# Patient Record
Sex: FEMALE | Race: WHITE | URBAN - METROPOLITAN AREA
[De-identification: names, ages, dates, MRNs, and addresses within clinical notes are randomized per-mention and may not be internally consistent; named-entity substitution may affect disease eponyms.]

---

## 2017-11-17 ENCOUNTER — OUTPATIENT (OUTPATIENT)
Dept: OUTPATIENT SERVICES | Facility: HOSPITAL | Age: 47
LOS: 1 days | Discharge: HOME | End: 2017-11-17

## 2017-11-17 VITALS
RESPIRATION RATE: 16 BRPM | WEIGHT: 166.01 LBS | SYSTOLIC BLOOD PRESSURE: 124 MMHG | TEMPERATURE: 98 F | HEIGHT: 62 IN | HEART RATE: 89 BPM | DIASTOLIC BLOOD PRESSURE: 70 MMHG | OXYGEN SATURATION: 98 %

## 2017-11-17 VITALS
OXYGEN SATURATION: 97 % | RESPIRATION RATE: 16 BRPM | SYSTOLIC BLOOD PRESSURE: 115 MMHG | HEART RATE: 89 BPM | TEMPERATURE: 99 F | DIASTOLIC BLOOD PRESSURE: 67 MMHG

## 2017-11-17 DIAGNOSIS — Z98.51 TUBAL LIGATION STATUS: Chronic | ICD-10-CM

## 2017-11-17 DIAGNOSIS — Z98.89 OTHER SPECIFIED POSTPROCEDURAL STATES: Chronic | ICD-10-CM

## 2017-11-17 DIAGNOSIS — Z98.1 ARTHRODESIS STATUS: Chronic | ICD-10-CM

## 2017-11-17 DIAGNOSIS — M65.4 RADIAL STYLOID TENOSYNOVITIS [DE QUERVAIN]: ICD-10-CM

## 2017-11-17 LAB
GLUCOSE BLDC GLUCOMTR-MCNC: 187 MG/DL — HIGH (ref 70–99)
HCG UR QL: NEGATIVE — SIGNIFICANT CHANGE UP

## 2017-11-17 RX ORDER — SODIUM CHLORIDE 9 MG/ML
3 INJECTION INTRAMUSCULAR; INTRAVENOUS; SUBCUTANEOUS EVERY 8 HOURS
Qty: 0 | Refills: 0 | Status: DISCONTINUED | OUTPATIENT
Start: 2017-11-17 | End: 2017-11-17

## 2017-11-17 RX ORDER — METFORMIN HYDROCHLORIDE 850 MG/1
1 TABLET ORAL
Qty: 0 | Refills: 0 | COMMUNITY

## 2017-11-17 RX ORDER — ACETAMINOPHEN 500 MG
1000 TABLET ORAL ONCE
Qty: 0 | Refills: 0 | Status: DISCONTINUED | OUTPATIENT
Start: 2017-11-17 | End: 2017-12-02

## 2017-11-17 RX ORDER — ONDANSETRON 8 MG/1
4 TABLET, FILM COATED ORAL ONCE
Qty: 0 | Refills: 0 | Status: DISCONTINUED | OUTPATIENT
Start: 2017-11-17 | End: 2017-11-17

## 2017-11-17 RX ORDER — SODIUM CHLORIDE 9 MG/ML
1000 INJECTION, SOLUTION INTRAVENOUS
Qty: 0 | Refills: 0 | Status: DISCONTINUED | OUTPATIENT
Start: 2017-11-17 | End: 2017-11-17

## 2017-11-17 RX ORDER — MORPHINE SULFATE 50 MG/1
3 CAPSULE, EXTENDED RELEASE ORAL
Qty: 0 | Refills: 0 | Status: DISCONTINUED | OUTPATIENT
Start: 2017-11-17 | End: 2017-11-17

## 2017-11-17 RX ORDER — CITALOPRAM 10 MG/1
1 TABLET, FILM COATED ORAL
Qty: 0 | Refills: 0 | COMMUNITY

## 2017-11-17 RX ADMIN — SODIUM CHLORIDE 100 MILLILITER(S): 9 INJECTION, SOLUTION INTRAVENOUS at 11:35

## 2017-11-17 NOTE — ASU DISCHARGE PLAN (ADULT/PEDIATRIC). - NOTIFY
Persistent Nausea and Vomiting/Swelling that continues/Bleeding that does not stop/Numbness, color, or temperature change to extremity/Fever greater than 101

## 2017-11-17 NOTE — BRIEF OPERATIVE NOTE - OPERATION/FINDINGS
dequervain's tenosynovitis of the right wrist requiring release of the first dorsal compartment. adequate excursion of tendons following release.

## 2017-11-17 NOTE — H&P PST ADULT - PMH
Anemia    Anxiety    Asthma    Diabetes    GERD (gastroesophageal reflux disease)    Hyperlipidemia    Hypertension    Insomnia

## 2017-11-17 NOTE — H&P PST ADULT - HISTORY OF PRESENT ILLNESS
47 year old female presents prior to right wrist dequervains release due to dequervains tenosynovitis.

## 2017-11-17 NOTE — BRIEF OPERATIVE NOTE - PROCEDURE
<<-----Click on this checkbox to enter Procedure Sedrick Rodríguez release  11/17/2017    Active  [id number]

## 2017-11-17 NOTE — ASU DISCHARGE PLAN (ADULT/PEDIATRIC). - MEDICATION SUMMARY - MEDICATIONS TO TAKE
I will START or STAY ON the medications listed below when I get home from the hospital:    aspirin 81 mg oral tablet  -- 1 tab(s) by mouth once a day  -- Indication: For Home med    Percocet 5/325 oral tablet  -- 1 tab(s) by mouth every 4 hours, As Needed for pain MDD:6  -- Caution federal law prohibits the transfer of this drug to any person other  than the person for whom it was prescribed.  May cause drowsiness.  Alcohol may intensify this effect.  Use care when operating dangerous machinery.  This prescription cannot be refilled.  This product contains acetaminophen.  Do not use  with any other product containing acetaminophen to prevent possible liver damage.  Using more of this medication than prescribed may cause serious breathing problems.    -- Indication: For Pain    lisinopril 5 mg oral tablet  -- 1 tab(s) by mouth once a day  -- Indication: For Home med    Desyrel  -- 50 milligram(s) by mouth once a day (at bedtime)  -- Indication: For Home med    glimepiride 1 mg oral tablet  -- 1 tab(s) by mouth once a day  -- Indication: For Home med    Lipitor 40 mg oral tablet  -- 1 tab(s) by mouth once a day (at bedtime)  -- Indication: For Home med    Tapazole 5 mg oral tablet  -- 1 tab(s) by mouth once a day  -- Indication: For Home med    ProAir HFA 90 mcg/inh inhalation aerosol  -- 2 puff(s) inhaled 4 times a day, As Needed  -- Indication: For home med    Dulera 200 mcg-5 mcg/inh inhalation aerosol  -- 1 puff(s) inhaled 2 times a day  -- Indication: For Home med    albuterol  -- 1 dose(s) inhaled every 4 hours, As Needed  -- Indication: For home med    Singulair 10 mg oral tablet  -- 1 tab(s) by mouth once a day (in the evening)  -- Indication: For Home med    PriLOSEC 40 mg oral delayed release capsule  -- 1 cap(s) by mouth once a day  -- Indication: For Home med    Wellbutrin  -- 200 milligram(s) by mouth every 12 hours  -- Indication: For HOme med

## 2017-11-17 NOTE — ASU DISCHARGE PLAN (ADULT/PEDIATRIC). - MEDICATION SUMMARY - MEDICATIONS TO STOP TAKING
I will STOP taking the medications listed below when I get home from the hospital:    acetaminophen-oxyCODONE 325 mg-5 mg oral tablet  -- 1 tab(s) by mouth every 4 hours as needed for pain. MDD:6   -- Caution federal law prohibits the transfer of this drug to any person other  than the person for whom it was prescribed.  May cause drowsiness.  Alcohol may intensify this effect.  Use care when operating dangerous machinery.  This prescription cannot be refilled.  This product contains acetaminophen.  Do not use  with any other product containing acetaminophen to prevent possible liver damage.  Using more of this medication than prescribed may cause serious breathing problems.

## 2017-11-17 NOTE — H&P PST ADULT - PSH
History of bone marrow biopsy    S/P cardiac catheterization  7/3/2013  S/P lumbar spinal fusion  April 2015  S/P tubal ligation  2009

## 2017-11-17 NOTE — ASU PREOP CHECKLIST - 1.
some red marks to bilateral hands/arms, no open wounds noted some red marks to bilateral hands/arms, no open wounds noted, dr. plasencia

## 2017-11-22 DIAGNOSIS — Z88.0 ALLERGY STATUS TO PENICILLIN: ICD-10-CM

## 2017-11-22 DIAGNOSIS — M65.4 RADIAL STYLOID TENOSYNOVITIS [DE QUERVAIN]: ICD-10-CM

## 2017-11-22 DIAGNOSIS — E78.5 HYPERLIPIDEMIA, UNSPECIFIED: ICD-10-CM

## 2017-11-22 DIAGNOSIS — D64.9 ANEMIA, UNSPECIFIED: ICD-10-CM

## 2017-11-22 DIAGNOSIS — E11.9 TYPE 2 DIABETES MELLITUS WITHOUT COMPLICATIONS: ICD-10-CM

## 2017-11-22 DIAGNOSIS — F41.9 ANXIETY DISORDER, UNSPECIFIED: ICD-10-CM

## 2017-11-22 DIAGNOSIS — Z79.82 LONG TERM (CURRENT) USE OF ASPIRIN: ICD-10-CM

## 2017-11-22 DIAGNOSIS — G47.00 INSOMNIA, UNSPECIFIED: ICD-10-CM

## 2017-11-22 DIAGNOSIS — I10 ESSENTIAL (PRIMARY) HYPERTENSION: ICD-10-CM

## 2017-11-22 DIAGNOSIS — K21.9 GASTRO-ESOPHAGEAL REFLUX DISEASE WITHOUT ESOPHAGITIS: ICD-10-CM

## 2017-11-22 DIAGNOSIS — J45.909 UNSPECIFIED ASTHMA, UNCOMPLICATED: ICD-10-CM

## 2017-11-22 DIAGNOSIS — Z79.84 LONG TERM (CURRENT) USE OF ORAL HYPOGLYCEMIC DRUGS: ICD-10-CM

## 2019-01-04 ENCOUNTER — TELEPHONE (OUTPATIENT)
Dept: GASTROENTEROLOGY | Facility: CLINIC | Age: 49
End: 2019-01-04

## 2019-01-04 NOTE — TELEPHONE ENCOUNTER
PATIENT CALLED FOR AN UPPER GI, SHE HAS NEVER BEEN SEEN PER OUR RECORDS PLEASE CALL HER AND SCHEDULE OV, I TRIED CALLING ONCE VM WAS FULL   THANKS

## 2019-01-15 ENCOUNTER — OFFICE VISIT (OUTPATIENT)
Dept: GASTROENTEROLOGY | Facility: CLINIC | Age: 49
End: 2019-01-15
Payer: COMMERCIAL

## 2019-01-15 VITALS
BODY MASS INDEX: 31.83 KG/M2 | DIASTOLIC BLOOD PRESSURE: 76 MMHG | SYSTOLIC BLOOD PRESSURE: 120 MMHG | WEIGHT: 173 LBS | RESPIRATION RATE: 16 BRPM | HEART RATE: 107 BPM | HEIGHT: 62 IN

## 2019-01-15 DIAGNOSIS — R10.13 EPIGASTRIC PAIN: ICD-10-CM

## 2019-01-15 DIAGNOSIS — R11.2 NAUSEA AND VOMITING, INTRACTABILITY OF VOMITING NOT SPECIFIED, UNSPECIFIED VOMITING TYPE: Primary | ICD-10-CM

## 2019-01-15 PROCEDURE — 99204 OFFICE O/P NEW MOD 45 MIN: CPT | Performed by: NURSE PRACTITIONER

## 2019-01-15 RX ORDER — LISINOPRIL 5 MG/1
5 TABLET ORAL DAILY
COMMUNITY

## 2019-01-15 RX ORDER — ASPIRIN 81 MG/1
81 TABLET ORAL DAILY
COMMUNITY

## 2019-01-15 RX ORDER — ATORVASTATIN CALCIUM 40 MG/1
40 TABLET, FILM COATED ORAL DAILY
COMMUNITY

## 2019-01-15 RX ORDER — METHIMAZOLE 5 MG/1
5 TABLET ORAL 3 TIMES DAILY
COMMUNITY
End: 2019-03-07

## 2019-01-15 RX ORDER — OMEPRAZOLE 40 MG/1
40 CAPSULE, DELAYED RELEASE ORAL DAILY
COMMUNITY

## 2019-01-15 RX ORDER — METHIMAZOLE 5 MG/1
5 TABLET ORAL 3 TIMES DAILY
COMMUNITY

## 2019-01-15 RX ORDER — MIRTAZAPINE 30 MG/1
15 TABLET, FILM COATED ORAL
COMMUNITY

## 2019-01-15 RX ORDER — ONDANSETRON 4 MG/1
4 TABLET, FILM COATED ORAL EVERY 8 HOURS PRN
Qty: 20 TABLET | Refills: 0 | Status: SHIPPED | OUTPATIENT
Start: 2019-01-15 | End: 2019-03-07

## 2019-01-15 RX ORDER — HYDROXYZINE HYDROCHLORIDE 25 MG/1
25 TABLET, FILM COATED ORAL 3 TIMES DAILY
COMMUNITY
End: 2019-04-18

## 2019-01-15 RX ORDER — ESOMEPRAZOLE MAGNESIUM 40 MG/1
40 CAPSULE, DELAYED RELEASE ORAL
Qty: 30 CAPSULE | Refills: 3 | Status: SHIPPED | OUTPATIENT
Start: 2019-01-15 | End: 2019-02-05

## 2019-01-15 RX ORDER — ATORVASTATIN CALCIUM 40 MG/1
40 TABLET, FILM COATED ORAL DAILY
COMMUNITY
End: 2019-03-07

## 2019-01-15 RX ORDER — SUCRALFATE 1 G/1
1 TABLET ORAL 4 TIMES DAILY
COMMUNITY
End: 2019-04-18

## 2019-01-15 RX ORDER — PANTOPRAZOLE SODIUM 20 MG/1
20 TABLET, DELAYED RELEASE ORAL DAILY
Status: ON HOLD | COMMUNITY
End: 2019-04-25 | Stop reason: ALTCHOICE

## 2019-01-15 RX ORDER — MONTELUKAST SODIUM 10 MG/1
10 TABLET ORAL
COMMUNITY

## 2019-01-15 NOTE — PROGRESS NOTES
Assessment/Plan:    Nausea, vomiting  Epigastric pain  EGD  Gaviscon, zofran and Nexium  2 week follow up post scope       Diagnoses and all orders for this visit:    Nausea and vomiting, intractability of vomiting not specified, unspecified vomiting type  -     aluminum hydroxide-magnesium carbonate (GAVISCON)  mg/15 mL oral suspension; Take 15 mL by mouth 4 (four) times daily (after meals and at bedtime)  -     esomeprazole (NexIUM) 40 MG capsule; Take 1 capsule (40 mg total) by mouth Daily at 2am for 30 days  -     ondansetron (ZOFRAN) 4 mg tablet; Take 1 tablet (4 mg total) by mouth every 8 (eight) hours as needed for nausea or vomiting    Epigastric pain  -     aluminum hydroxide-magnesium carbonate (GAVISCON)  mg/15 mL oral suspension; Take 15 mL by mouth 4 (four) times daily (after meals and at bedtime)  -     esomeprazole (NexIUM) 40 MG capsule; Take 1 capsule (40 mg total) by mouth Daily at 2am for 30 days  -     ondansetron (ZOFRAN) 4 mg tablet; Take 1 tablet (4 mg total) by mouth every 8 (eight) hours as needed for nausea or vomiting    Other orders  -     metFORMIN (GLUCOPHAGE) 1000 MG tablet; Take 1,000 mg by mouth 2 (two) times a day with meals  -     Albuterol Sulfate (VENTOLIN HFA IN); Inhale 90 mcg  -     montelukast (SINGULAIR) 10 mg tablet; Take 10 mg by mouth daily at bedtime  -     methimazole (TAPAZOLE) 5 mg tablet; Take 5 mg by mouth 3 (three) times a day  -     aspirin (ECOTRIN LOW STRENGTH) 81 mg EC tablet; Take 81 mg by mouth daily  -     atorvastatin (LIPITOR) 40 mg tablet; Take 40 mg by mouth daily  -     omeprazole (PriLOSEC) 40 MG capsule; Take 40 mg by mouth daily  -     lisinopril (ZESTRIL) 5 mg tablet; Take 5 mg by mouth daily  -     atorvastatin (LIPITOR) 40 mg tablet; Take 40 mg by mouth daily  -     methimazole (TAPAZOLE) 5 mg tablet; Take 5 mg by mouth 3 (three) times a day  -     mirtazapine (REMERON) 30 mg tablet;  Take 15 mg by mouth daily at bedtime  - Mirtazapine (REMERON PO); Take 100 mg by mouth daily at bedtime  -     pantoprazole (PROTONIX) 20 mg tablet; Take 20 mg by mouth daily  -     sucralfate (CARAFATE) 1 g tablet; Take 1 g by mouth 4 (four) times a day  -     hydrOXYzine HCL (ATARAX) 25 mg tablet; Take 25 mg by mouth 3 (three) times a day            Subjective:      Patient ID: Nellei Farr is a 50 y o  female  41-year-old female who is a current every day smoker is here for for ER follow-up for epigastric pain  She reports nausea, vomiting, heartburn, epigastric pain postprandially  She takes over the counter Nexium which helps with her symptoms  She has never had an endoscopy  She has had some mild weight loss over the past two years  She has no family history of GI issues  She has no sore throat, hoarse voice  She saw a gastroenterologist in [place] and then again a [address] did not follow through with endoscopies  We talked about GERD diet and she is agreeable to that although she does not feel she could quit smoking and is agreeable to an endoscopy because her symptoms      Abdominal Pain   This is a chronic problem  The current episode started more than 1 year ago  The onset quality is undetermined  The problem occurs 2 to 4 times per day  The problem has been unchanged  The pain is located in the epigastric region  The pain is at a severity of 6/10  The pain is moderate  The quality of the pain is colicky  The abdominal pain radiates to the epigastric region  Associated symptoms include nausea and vomiting  The pain is aggravated by eating  She has tried proton pump inhibitors and antacids for the symptoms  The treatment provided moderate relief  Prior diagnostic workup includes GI consult  Her past medical history is significant for GERD  Nausea   Associated symptoms include abdominal pain, nausea and vomiting  Pertinent negatives include no fatigue  Heartburn   She complains of abdominal pain and nausea   Pertinent negatives include no fatigue  The following portions of the patient's history were reviewed and updated as appropriate: She  has a past medical history of Acid reflux; Asthma; Diabetes ([address] ); and Hyperthyroidism  She   Patient Active Problem List    Diagnosis Date Noted    Epigastric pain 01/15/2019    Nausea and vomiting 01/15/2019     She  has a past surgical history that includes Cholecystectomy; Back surgery (2015); Carpal tunnel release (Bilateral); Trigger finger release (Bilateral); and Knee surgery  Her family history includes Diabetes in her mother; Heart disease in her mother; Pneumonia in her father  She  reports that she has been smoking  She has never used smokeless tobacco  She reports that she does not drink alcohol or use drugs    Current Outpatient Prescriptions   Medication Sig Dispense Refill    Albuterol Sulfate (VENTOLIN HFA IN) Inhale 90 mcg      aspirin (ECOTRIN LOW STRENGTH) 81 mg EC tablet Take 81 mg by mouth daily      atorvastatin (LIPITOR) 40 mg tablet Take 40 mg by mouth daily      atorvastatin (LIPITOR) 40 mg tablet Take 40 mg by mouth daily      FLUARIX QUADRIVALENT 0 5 ML MODE inject 0 5 milliliter intramuscularly  0    hydrOXYzine HCL (ATARAX) 25 mg tablet Take 25 mg by mouth 3 (three) times a day      lisinopril (ZESTRIL) 5 mg tablet Take 5 mg by mouth daily      metFORMIN (GLUCOPHAGE) 1000 MG tablet Take 1,000 mg by mouth 2 (two) times a day with meals      methimazole (TAPAZOLE) 5 mg tablet Take 5 mg by mouth 3 (three) times a day      methimazole (TAPAZOLE) 5 mg tablet Take 5 mg by mouth 3 (three) times a day      Mirtazapine (REMERON PO) Take 100 mg by mouth daily at bedtime      mirtazapine (REMERON) 30 mg tablet Take 15 mg by mouth daily at bedtime      montelukast (SINGULAIR) 10 mg tablet Take 10 mg by mouth daily at bedtime      omeprazole (PriLOSEC) 40 MG capsule Take 40 mg by mouth daily      pantoprazole (PROTONIX) 20 mg tablet Take 20 mg by mouth daily  sucralfate (CARAFATE) 1 g tablet Take 1 g by mouth 4 (four) times a day      aluminum hydroxide-magnesium carbonate (GAVISCON)  mg/15 mL oral suspension Take 15 mL by mouth 4 (four) times daily (after meals and at bedtime) 1 Bottle 0    esomeprazole (NexIUM) 40 MG capsule Take 1 capsule (40 mg total) by mouth Daily at 2am for 30 days 30 capsule 3    ondansetron (ZOFRAN) 4 mg tablet Take 1 tablet (4 mg total) by mouth every 8 (eight) hours as needed for nausea or vomiting 20 tablet 0     No current facility-administered medications for this visit  Current Outpatient Prescriptions on File Prior to Visit   Medication Sig    FLUARIX QUADRIVALENT 0 5 ML MODE inject 0 5 milliliter intramuscularly    [DISCONTINUED] dicyclomine (BENTYL) 10 mg capsule take 1 capsule by mouth every 6 hours if needed FOR INTESTINAL SPASMS    [DISCONTINUED] PNEUMOVAX 23 25 MCG/0 5ML inject 0 5 milliliter intramuscularly    [DISCONTINUED] sulfamethoxazole-trimethoprim (BACTRIM DS) 800-160 mg per tablet TAKE 1 TABLET BY MOUTH TWICE A DAY FOR 10 DAYS     No current facility-administered medications on file prior to visit  She is allergic to penicillins       Review of Systems   Constitutional: Negative for fatigue  HENT: Negative  Respiratory: Negative  Cardiovascular: Negative  Gastrointestinal: Positive for abdominal pain, nausea and vomiting  Skin: Negative  Psychiatric/Behavioral: Negative  Objective:      /76   Pulse (!) 107   Resp 16   Ht 5' 2" (1 575 m)   Wt 78 5 kg (173 lb)   BMI 31 64 kg/m²          Physical Exam   Constitutional: She appears well-developed and well-nourished  Cardiovascular: Normal rate and regular rhythm  Pulmonary/Chest: Effort normal and breath sounds normal    Abdominal: Soft  Bowel sounds are normal  There is tenderness

## 2019-02-05 ENCOUNTER — OFFICE VISIT (OUTPATIENT)
Dept: GASTROENTEROLOGY | Facility: CLINIC | Age: 49
End: 2019-02-05
Payer: COMMERCIAL

## 2019-02-05 VITALS
RESPIRATION RATE: 18 BRPM | SYSTOLIC BLOOD PRESSURE: 120 MMHG | HEIGHT: 62 IN | HEART RATE: 118 BPM | BODY MASS INDEX: 32.76 KG/M2 | WEIGHT: 178 LBS | DIASTOLIC BLOOD PRESSURE: 80 MMHG

## 2019-02-05 DIAGNOSIS — R10.30 LOWER ABDOMINAL PAIN: ICD-10-CM

## 2019-02-05 DIAGNOSIS — R11.2 NAUSEA AND VOMITING, INTRACTABILITY OF VOMITING NOT SPECIFIED, UNSPECIFIED VOMITING TYPE: Primary | ICD-10-CM

## 2019-02-05 DIAGNOSIS — K21.9 CHRONIC GERD: ICD-10-CM

## 2019-02-05 PROCEDURE — 99214 OFFICE O/P EST MOD 30 MIN: CPT | Performed by: NURSE PRACTITIONER

## 2019-02-05 RX ORDER — ESOMEPRAZOLE MAGNESIUM 40 MG/1
40 CAPSULE, DELAYED RELEASE ORAL DAILY
Qty: 90 CAPSULE | Refills: 3 | Status: SHIPPED | OUTPATIENT
Start: 2019-02-05

## 2019-02-05 RX ORDER — DICYCLOMINE HCL 20 MG
20 TABLET ORAL EVERY 6 HOURS PRN
Qty: 60 TABLET | Refills: 3 | Status: SHIPPED | OUTPATIENT
Start: 2019-02-05

## 2019-02-05 RX ORDER — ONDANSETRON 4 MG/1
4 TABLET, FILM COATED ORAL EVERY 6 HOURS PRN
Qty: 30 TABLET | Refills: 2 | Status: SHIPPED | OUTPATIENT
Start: 2019-02-05

## 2019-02-05 RX ORDER — FAMOTIDINE 40 MG/1
40 TABLET, FILM COATED ORAL
Qty: 30 TABLET | Refills: 6 | Status: SHIPPED | OUTPATIENT
Start: 2019-02-05 | End: 2019-06-21 | Stop reason: SDUPTHER

## 2019-02-05 NOTE — PROGRESS NOTES
Assessment/Plan:    Last saw on 1/15 EGD recommended along with Gaviscon, Nexium and Zofran  Plan:  Stop Gaviscon and give Pepcid at night  Nexium in the morning  Zofran as needed  Bentyl for lower abdominal discomfort  Upper GI with small-bowel follow-through her       Diagnoses and all orders for this visit:    Nausea and vomiting, intractability of vomiting not specified, unspecified vomiting type  -     FL upper GI / small bowel with air; Future  -     famotidine (PEPCID) 40 MG tablet; Take 1 tablet (40 mg total) by mouth daily at bedtime  -     esomeprazole (NexIUM) 40 MG capsule; Take 1 capsule (40 mg total) by mouth daily  -     dicyclomine (BENTYL) 20 mg tablet; Take 1 tablet (20 mg total) by mouth every 6 (six) hours as needed (abdominal pain)  -     ondansetron (ZOFRAN) 4 mg tablet; Take 1 tablet (4 mg total) by mouth every 6 (six) hours as needed for nausea or vomiting    Lower abdominal pain  -     FL upper GI / small bowel with air; Future  -     famotidine (PEPCID) 40 MG tablet; Take 1 tablet (40 mg total) by mouth daily at bedtime  -     esomeprazole (NexIUM) 40 MG capsule; Take 1 capsule (40 mg total) by mouth daily  -     dicyclomine (BENTYL) 20 mg tablet; Take 1 tablet (20 mg total) by mouth every 6 (six) hours as needed (abdominal pain)  -     ondansetron (ZOFRAN) 4 mg tablet; Take 1 tablet (4 mg total) by mouth every 6 (six) hours as needed for nausea or vomiting    Chronic GERD  -     FL upper GI / small bowel with air; Future  -     famotidine (PEPCID) 40 MG tablet; Take 1 tablet (40 mg total) by mouth daily at bedtime  -     esomeprazole (NexIUM) 40 MG capsule; Take 1 capsule (40 mg total) by mouth daily  -     dicyclomine (BENTYL) 20 mg tablet; Take 1 tablet (20 mg total) by mouth every 6 (six) hours as needed (abdominal pain)  -     ondansetron (ZOFRAN) 4 mg tablet;  Take 1 tablet (4 mg total) by mouth every 6 (six) hours as needed for nausea or vomiting            Subjective:      Patient ID: Shirin Rocha is a 50 y o  female  Had endoscopy on January 25th and normal biopsies  Her report is not available as of yet  She did utilize although Zofran for nausea and continues with that even on Nexium and Gaviscon  She was taking her Nexium at night with Gaviscon  We will change her medications to Nexium in the morning and Pepcid at night Zofran as needed and because she is having some lower abdominal discomfort we will add Bentyl  She is taking that before good effect  She does not follow GERD diet so we spent a significant amount of the visit talking about a GERD diet and she is agreeable to follow  She will also get an upper GI with small-bowel follow-through for than continual nausea and vomiting that happens maybe one to 2 times a week not associated with any certain foods  She does smoke marijuana once a week and I did ask her to stop doing that she is a current every day smoker and did ask her to try refrain from smoking and she does not drink alcohol  The following portions of the patient's history were reviewed and updated as appropriate: She  has a past medical history of Acid reflux; Asthma; Diabetes ([facility] [address] ); and Hyperthyroidism  She   Patient Active Problem List    Diagnosis Date Noted    Chronic GERD 02/05/2019    Lower abdominal pain 02/05/2019    Epigastric pain 01/15/2019    Nausea and vomiting 01/15/2019     She  has a past surgical history that includes Cholecystectomy; Back surgery (2015); Carpal tunnel release (Bilateral); Trigger finger release (Bilateral); and Knee surgery  Her family history includes Diabetes in her mother; Heart disease in her mother; Pneumonia in her father  She  reports that she has been smoking  She has never used smokeless tobacco  She reports that she does not drink alcohol or use drugs    Current Outpatient Prescriptions   Medication Sig Dispense Refill    Albuterol Sulfate (VENTOLIN HFA IN) Inhale 90 mcg      aluminum hydroxide-magnesium carbonate (GAVISCON)  mg/15 mL oral suspension Take 15 mL by mouth 4 (four) times daily (after meals and at bedtime) 1 Bottle 0    aspirin (ECOTRIN LOW STRENGTH) 81 mg EC tablet Take 81 mg by mouth daily      atorvastatin (LIPITOR) 40 mg tablet Take 40 mg by mouth daily      atorvastatin (LIPITOR) 40 mg tablet Take 40 mg by mouth daily      FLUARIX QUADRIVALENT 0 5 ML MODE inject 0 5 milliliter intramuscularly  0    hydrOXYzine HCL (ATARAX) 25 mg tablet Take 25 mg by mouth 3 (three) times a day      lisinopril (ZESTRIL) 5 mg tablet Take 5 mg by mouth daily      metFORMIN (GLUCOPHAGE) 1000 MG tablet Take 1,000 mg by mouth 2 (two) times a day with meals      methimazole (TAPAZOLE) 5 mg tablet Take 5 mg by mouth 3 (three) times a day      methimazole (TAPAZOLE) 5 mg tablet Take 5 mg by mouth 3 (three) times a day      Mirtazapine (REMERON PO) Take 100 mg by mouth daily at bedtime      mirtazapine (REMERON) 30 mg tablet Take 15 mg by mouth daily at bedtime      montelukast (SINGULAIR) 10 mg tablet Take 10 mg by mouth daily at bedtime      omeprazole (PriLOSEC) 40 MG capsule Take 40 mg by mouth daily      ondansetron (ZOFRAN) 4 mg tablet Take 1 tablet (4 mg total) by mouth every 8 (eight) hours as needed for nausea or vomiting 20 tablet 0    pantoprazole (PROTONIX) 20 mg tablet Take 20 mg by mouth daily      sucralfate (CARAFATE) 1 g tablet Take 1 g by mouth 4 (four) times a day      dicyclomine (BENTYL) 20 mg tablet Take 1 tablet (20 mg total) by mouth every 6 (six) hours as needed (abdominal pain) 60 tablet 3    esomeprazole (NexIUM) 40 MG capsule Take 1 capsule (40 mg total) by mouth daily 90 capsule 3    famotidine (PEPCID) 40 MG tablet Take 1 tablet (40 mg total) by mouth daily at bedtime 30 tablet 6    ondansetron (ZOFRAN) 4 mg tablet Take 1 tablet (4 mg total) by mouth every 6 (six) hours as needed for nausea or vomiting 30 tablet 2     No current facility-administered medications for this visit  Current Outpatient Prescriptions on File Prior to Visit   Medication Sig    Albuterol Sulfate (VENTOLIN HFA IN) Inhale 90 mcg    aluminum hydroxide-magnesium carbonate (GAVISCON)  mg/15 mL oral suspension Take 15 mL by mouth 4 (four) times daily (after meals and at bedtime)    aspirin (ECOTRIN LOW STRENGTH) 81 mg EC tablet Take 81 mg by mouth daily    atorvastatin (LIPITOR) 40 mg tablet Take 40 mg by mouth daily    atorvastatin (LIPITOR) 40 mg tablet Take 40 mg by mouth daily    FLUARIX QUADRIVALENT 0 5 ML MODE inject 0 5 milliliter intramuscularly    hydrOXYzine HCL (ATARAX) 25 mg tablet Take 25 mg by mouth 3 (three) times a day    lisinopril (ZESTRIL) 5 mg tablet Take 5 mg by mouth daily    metFORMIN (GLUCOPHAGE) 1000 MG tablet Take 1,000 mg by mouth 2 (two) times a day with meals    methimazole (TAPAZOLE) 5 mg tablet Take 5 mg by mouth 3 (three) times a day    methimazole (TAPAZOLE) 5 mg tablet Take 5 mg by mouth 3 (three) times a day    Mirtazapine (REMERON PO) Take 100 mg by mouth daily at bedtime    mirtazapine (REMERON) 30 mg tablet Take 15 mg by mouth daily at bedtime    montelukast (SINGULAIR) 10 mg tablet Take 10 mg by mouth daily at bedtime    omeprazole (PriLOSEC) 40 MG capsule Take 40 mg by mouth daily    ondansetron (ZOFRAN) 4 mg tablet Take 1 tablet (4 mg total) by mouth every 8 (eight) hours as needed for nausea or vomiting    pantoprazole (PROTONIX) 20 mg tablet Take 20 mg by mouth daily    sucralfate (CARAFATE) 1 g tablet Take 1 g by mouth 4 (four) times a day    [DISCONTINUED] esomeprazole (NexIUM) 40 MG capsule Take 1 capsule (40 mg total) by mouth Daily at 2am for 30 days     No current facility-administered medications on file prior to visit  She is allergic to penicillins       Review of Systems   Constitutional: Negative for fatigue  HENT: Negative  Respiratory: Negative  Cardiovascular: Negative      Gastrointestinal: Positive for abdominal pain, nausea and vomiting  Skin: Negative  Psychiatric/Behavioral: Negative  Objective:      /80   Pulse (!) 118   Resp 18   Ht 5' 2" (1 575 m)   Wt 80 7 kg (178 lb)   BMI 32 56 kg/m²          Physical Exam   Constitutional: She is oriented to person, place, and time  She appears well-developed and well-nourished  Eyes: No scleral icterus  Cardiovascular: Normal rate and regular rhythm  Pulmonary/Chest: Effort normal and breath sounds normal    Abdominal: Soft  Bowel sounds are normal    Lymphadenopathy:     She has no cervical adenopathy  Neurological: She is alert and oriented to person, place, and time  Skin: Skin is warm and dry  Psychiatric: She has a normal mood and affect

## 2019-02-15 ENCOUNTER — HOSPITAL ENCOUNTER (OUTPATIENT)
Dept: RADIOLOGY | Facility: HOSPITAL | Age: 49
Discharge: HOME | End: 2019-02-15

## 2019-02-15 DIAGNOSIS — R10.30 LOWER ABDOMINAL PAIN: ICD-10-CM

## 2019-02-15 DIAGNOSIS — R11.2 NAUSEA AND VOMITING, INTRACTABILITY OF VOMITING NOT SPECIFIED, UNSPECIFIED VOMITING TYPE: ICD-10-CM

## 2019-02-15 DIAGNOSIS — K21.9 CHRONIC GERD: ICD-10-CM

## 2019-03-07 ENCOUNTER — OFFICE VISIT (OUTPATIENT)
Dept: OBGYN CLINIC | Facility: CLINIC | Age: 49
End: 2019-03-07
Payer: COMMERCIAL

## 2019-03-07 VITALS
DIASTOLIC BLOOD PRESSURE: 85 MMHG | BODY MASS INDEX: 32.76 KG/M2 | HEART RATE: 106 BPM | WEIGHT: 178 LBS | HEIGHT: 62 IN | SYSTOLIC BLOOD PRESSURE: 126 MMHG

## 2019-03-07 DIAGNOSIS — G56.22 ULNAR NEURITIS, LEFT: Primary | ICD-10-CM

## 2019-03-07 PROCEDURE — 99203 OFFICE O/P NEW LOW 30 MIN: CPT | Performed by: ORTHOPAEDIC SURGERY

## 2019-03-07 NOTE — PROGRESS NOTES
CHIEF COMPLAINT:  Chief Complaint   Patient presents with    Left Wrist - Pain       SUBJECTIVE:  Marisabel Cedillo is a 50y o  year old RHD female who presents for initial evaluation of left wrist pain/numbness in the small and ring fingers  She states that these symptoms have been occurring for a few years  Also complains about occasional pain in the left elbow  Her left hand feels cold to the touch sometimes  She is an insulin dependant diabetic  Denies any recent wrist or hand xrays or EMG  She has an open carpal tunnel release in [place] in 2011 and had relief of the numbness/tingiing in the hand for aobut 4 years  The numbness/tingling and pain has now returned, but is now predominately in the small and ring fingers          PAST MEDICAL HISTORY:  Past Medical History:   Diagnosis Date    Acid reflux     Asthma     Diabetes ([address] )     Hyperthyroidism        PAST SURGICAL HISTORY:  Past Surgical History:   Procedure Laterality Date    BACK SURGERY  2015    CARPAL TUNNEL RELEASE Bilateral     CHOLECYSTECTOMY      KNEE SURGERY      TRIGGER FINGER RELEASE Bilateral        FAMILY HISTORY:  Family History   Problem Relation Age of Onset    Diabetes Mother     Heart disease Mother     Pneumonia Father        SOCIAL HISTORY:  Social History     Tobacco Use    Smoking status: Current Every Day Smoker    Smokeless tobacco: Never Used   Substance Use Topics    Alcohol use: No    Drug use: No       MEDICATIONS:    Current Outpatient Medications:     albuterol (PROVENTIL HFA,VENTOLIN HFA) 90 mcg/act inhaler, every 4 (four) hours, Disp: , Rfl:     aluminum hydroxide-magnesium carbonate (GAVISCON)  mg/15 mL oral suspension, Take 15 mL by mouth 4 (four) times daily (after meals and at bedtime), Disp: 1 Bottle, Rfl: 0    aspirin (ECOTRIN LOW STRENGTH) 81 mg EC tablet, Take 81 mg by mouth daily, Disp: , Rfl:     atorvastatin (LIPITOR) 40 mg tablet, Take 40 mg by mouth daily, Disp: , Rfl:    dicyclomine (BENTYL) 20 mg tablet, Take 1 tablet (20 mg total) by mouth every 6 (six) hours as needed (abdominal pain), Disp: 60 tablet, Rfl: 3    esomeprazole (NexIUM) 40 MG capsule, Take 1 capsule (40 mg total) by mouth daily, Disp: 90 capsule, Rfl: 3    famotidine (PEPCID) 40 MG tablet, Take 1 tablet (40 mg total) by mouth daily at bedtime, Disp: 30 tablet, Rfl: 6    FLUARIX QUADRIVALENT 0 5 ML MODE, inject 0 5 milliliter intramuscularly, Disp: , Rfl: 0    hydrOXYzine HCL (ATARAX) 25 mg tablet, Take 25 mg by mouth 3 (three) times a day, Disp: , Rfl:     lisinopril (ZESTRIL) 5 mg tablet, Take 5 mg by mouth daily, Disp: , Rfl:     metFORMIN (GLUCOPHAGE) 1000 MG tablet, Take 1,000 mg by mouth 2 (two) times a day with meals, Disp: , Rfl:     methimazole (TAPAZOLE) 5 mg tablet, Take 5 mg by mouth 3 (three) times a day, Disp: , Rfl:     mirtazapine (REMERON) 30 mg tablet, Take 15 mg by mouth daily at bedtime, Disp: , Rfl:     montelukast (SINGULAIR) 10 mg tablet, Take 10 mg by mouth daily at bedtime, Disp: , Rfl:     omeprazole (PriLOSEC) 40 MG capsule, Take 40 mg by mouth daily, Disp: , Rfl:     ondansetron (ZOFRAN) 4 mg tablet, Take 1 tablet (4 mg total) by mouth every 6 (six) hours as needed for nausea or vomiting, Disp: 30 tablet, Rfl: 2    pantoprazole (PROTONIX) 20 mg tablet, Take 20 mg by mouth daily, Disp: , Rfl:     sucralfate (CARAFATE) 1 g tablet, Take 1 g by mouth 4 (four) times a day, Disp: , Rfl:     ALLERGIES:  Allergies   Allergen Reactions    Penicillins Hives and Rash       REVIEW OF SYSTEMS:  Review of Systems   Constitutional: Negative for chills, fever and unexpected weight change  HENT: Negative for hearing loss, nosebleeds and sore throat  Eyes: Negative for pain, redness and visual disturbance  Respiratory: Negative for cough, shortness of breath and wheezing  Cardiovascular: Negative for chest pain, palpitations and leg swelling     Gastrointestinal: Negative for abdominal pain, nausea and vomiting  Endocrine: Negative for polydipsia and polyuria  Genitourinary: Negative for dysuria and hematuria  Musculoskeletal: Positive for arthralgias  Negative for joint swelling and myalgias  Skin: Negative for rash and wound  Neurological: Negative for dizziness, numbness and headaches  Psychiatric/Behavioral: Negative for decreased concentration and suicidal ideas  The patient is nervous/anxious  VITALS:  Vitals:    03/07/19 1049   BP: 126/85   Pulse: (!) 106       LABS:  HgA1c: No results found for: HGBA1C  BMP: No results found for: GLUCOSE, CALCIUM, NA, K, CO2, CL, BUN, CREATININE    _____________________________________________________  PHYSICAL EXAMINATION:  General: well developed and well nourished, alert, oriented times 3 and appears comfortable  Psychiatric: Normal  HEENT: Trachea Midline, No torticollis  Pulmonary: No audible wheezing or respiratory distress   Skin: No masses, erythema, lacerations, fluctation, ulcerations  Neurovascular: Sensation Intact to the Median, Ulnar, Radial Nerve, Motor Intact to the Median, Ulnar, Radial Nerve and Pulses Intact    MUSCULOSKELETAL EXAMINATION:  Left Ulnar Nerve Exam:  Negative intrinsic atrophy  Negative deformity at the elbow  Full range of motion with flexion and extension of the elbow without ulnar nerve subluxation  Negative ulnar nerve compression test at the elbow  Positive tinels over the ulnar nerve at the elbow  Negative cross finger test in the index and long fingers fingers  FDP strength is 5/5 to the ring finger  FDP strength is 5/5 to the small finger  Intrinsic strength 5/5    2 point discrimination is 4 mm throughout for the long, ring and small fingers  ___________________________________________________  STUDIES REVIEWED:  No studies reviewed         PROCEDURES PERFORMED:  Procedures  No Procedures performed today    _____________________________________________________  ASSESSMENT/PLAN:    Left elbow ulnar neuritis  * Avoid hyperflexing the elbow  * EMG of the LUE ordered  The patient denies any current issues on the right hand/arm  * Thick elbow sleeve recommended for nighttime use to allow some bend in the elbow, but will help keep the elbow extended at night  Follow Up:  Return for After EMG  To Do Next Visit:  Re-evaluation of current issue    Go over EMG      Scribe Attestation    I,:   Shelah Dance, PA-C am acting as a scribe while in the presence of the attending physician :        I,:   Cyrus Parry MD personally performed the services described in this documentation    as scribed in my presence :

## 2019-03-27 ENCOUNTER — HOSPITAL ENCOUNTER (OUTPATIENT)
Dept: RADIOLOGY | Facility: HOSPITAL | Age: 49
Discharge: HOME | End: 2019-03-27
Payer: COMMERCIAL

## 2019-03-27 PROCEDURE — 74249 HB CONTRST X-RAY UPPR GI TRACT (SMALL INTESTINE FOLLOW-THROUGH): CPT

## 2019-04-10 ENCOUNTER — OFFICE VISIT (OUTPATIENT)
Dept: GASTROENTEROLOGY | Facility: CLINIC | Age: 49
End: 2019-04-10
Payer: COMMERCIAL

## 2019-04-10 ENCOUNTER — TELEPHONE (OUTPATIENT)
Dept: GASTROENTEROLOGY | Facility: CLINIC | Age: 49
End: 2019-04-10

## 2019-04-10 VITALS
RESPIRATION RATE: 18 BRPM | WEIGHT: 186 LBS | DIASTOLIC BLOOD PRESSURE: 70 MMHG | BODY MASS INDEX: 34.23 KG/M2 | HEIGHT: 62 IN | SYSTOLIC BLOOD PRESSURE: 106 MMHG | HEART RATE: 126 BPM

## 2019-04-10 DIAGNOSIS — Z12.11 ENCOUNTER FOR SCREENING COLONOSCOPY: Primary | ICD-10-CM

## 2019-04-10 DIAGNOSIS — K21.9 CHRONIC GERD: Primary | ICD-10-CM

## 2019-04-10 DIAGNOSIS — D64.9 ANEMIA, UNSPECIFIED TYPE: ICD-10-CM

## 2019-04-10 DIAGNOSIS — R10.30 LOWER ABDOMINAL PAIN: ICD-10-CM

## 2019-04-10 DIAGNOSIS — F17.200 CURRENT EVERY DAY SMOKER: ICD-10-CM

## 2019-04-10 PROCEDURE — 99214 OFFICE O/P EST MOD 30 MIN: CPT | Performed by: NURSE PRACTITIONER

## 2019-04-10 RX ORDER — HYDROXYZINE PAMOATE 50 MG/1
50 CAPSULE ORAL 3 TIMES DAILY PRN
COMMUNITY

## 2019-04-10 RX ORDER — ARIPIPRAZOLE 10 MG/1
10 TABLET ORAL DAILY
COMMUNITY

## 2019-04-10 RX ORDER — POLYETHYLENE GLYCOL 3350 17 G/17G
POWDER, FOR SOLUTION ORAL
Qty: 250 G | Refills: 0 | Status: ON HOLD | OUTPATIENT
Start: 2019-04-10 | End: 2019-04-25 | Stop reason: ALTCHOICE

## 2019-04-10 RX ORDER — INSULIN GLARGINE 100 [IU]/ML
20 INJECTION, SOLUTION SUBCUTANEOUS
COMMUNITY

## 2019-04-10 RX ORDER — QUETIAPINE FUMARATE 100 MG/1
100 TABLET, FILM COATED ORAL
COMMUNITY

## 2019-04-10 RX ORDER — FLUOXETINE HYDROCHLORIDE 20 MG/5ML
LIQUID ORAL DAILY
COMMUNITY

## 2019-04-15 ENCOUNTER — PROCEDURE VISIT (OUTPATIENT)
Dept: NEUROLOGY | Facility: CLINIC | Age: 49
End: 2019-04-15
Payer: COMMERCIAL

## 2019-04-15 DIAGNOSIS — G56.22 ULNAR NEUROPATHY OF LEFT UPPER EXTREMITY: Primary | ICD-10-CM

## 2019-04-15 PROCEDURE — 95909 NRV CNDJ TST 5-6 STUDIES: CPT | Performed by: PHYSICAL MEDICINE & REHABILITATION

## 2019-04-15 PROCEDURE — 95886 MUSC TEST DONE W/N TEST COMP: CPT | Performed by: PHYSICAL MEDICINE & REHABILITATION

## 2019-04-18 ENCOUNTER — OFFICE VISIT (OUTPATIENT)
Dept: OBGYN CLINIC | Facility: CLINIC | Age: 49
End: 2019-04-18
Payer: COMMERCIAL

## 2019-04-18 VITALS
SYSTOLIC BLOOD PRESSURE: 115 MMHG | HEIGHT: 62 IN | DIASTOLIC BLOOD PRESSURE: 76 MMHG | WEIGHT: 185.6 LBS | BODY MASS INDEX: 34.16 KG/M2 | HEART RATE: 92 BPM

## 2019-04-18 DIAGNOSIS — G56.22 CUBITAL TUNNEL SYNDROME ON LEFT: Primary | ICD-10-CM

## 2019-04-18 PROCEDURE — 99213 OFFICE O/P EST LOW 20 MIN: CPT | Performed by: ORTHOPAEDIC SURGERY

## 2019-04-23 ENCOUNTER — OFFICE VISIT (OUTPATIENT)
Dept: GASTROENTEROLOGY | Facility: CLINIC | Age: 49
End: 2019-04-23
Payer: COMMERCIAL

## 2019-04-23 VITALS
SYSTOLIC BLOOD PRESSURE: 118 MMHG | HEART RATE: 126 BPM | BODY MASS INDEX: 33.68 KG/M2 | DIASTOLIC BLOOD PRESSURE: 70 MMHG | HEIGHT: 62 IN | RESPIRATION RATE: 18 BRPM | WEIGHT: 183 LBS

## 2019-04-23 DIAGNOSIS — R10.13 EPIGASTRIC PAIN: ICD-10-CM

## 2019-04-23 DIAGNOSIS — K21.9 CHRONIC GERD: Primary | ICD-10-CM

## 2019-04-23 DIAGNOSIS — R11.2 NAUSEA AND VOMITING, INTRACTABILITY OF VOMITING NOT SPECIFIED, UNSPECIFIED VOMITING TYPE: ICD-10-CM

## 2019-04-23 PROCEDURE — 99213 OFFICE O/P EST LOW 20 MIN: CPT | Performed by: NURSE PRACTITIONER

## 2019-04-25 ENCOUNTER — ANESTHESIA EVENT (OUTPATIENT)
Dept: PERIOP | Facility: HOSPITAL | Age: 49
End: 2019-04-25
Payer: COMMERCIAL

## 2019-04-25 ENCOUNTER — HOSPITAL ENCOUNTER (OUTPATIENT)
Facility: HOSPITAL | Age: 49
Setting detail: OUTPATIENT SURGERY
Discharge: HOME | End: 2019-04-25
Attending: INTERNAL MEDICINE | Admitting: INTERNAL MEDICINE
Payer: COMMERCIAL

## 2019-04-25 ENCOUNTER — ANESTHESIA (OUTPATIENT)
Dept: PERIOP | Facility: HOSPITAL | Age: 49
End: 2019-04-25
Payer: COMMERCIAL

## 2019-04-25 VITALS
DIASTOLIC BLOOD PRESSURE: 78 MMHG | OXYGEN SATURATION: 98 % | WEIGHT: 183.86 LBS | TEMPERATURE: 97.2 F | BODY MASS INDEX: 33.84 KG/M2 | HEART RATE: 89 BPM | HEIGHT: 62 IN | SYSTOLIC BLOOD PRESSURE: 132 MMHG | RESPIRATION RATE: 25 BRPM

## 2019-04-25 DIAGNOSIS — K21.9 GASTRO-ESOPHAGEAL REFLUX DISEASE WITHOUT ESOPHAGITIS: ICD-10-CM

## 2019-04-25 DIAGNOSIS — D64.9 ANEMIA, UNSPECIFIED TYPE: ICD-10-CM

## 2019-04-25 LAB — GLUCOSE SERPL-MCNC: 299 MG/DL (ref 65–140)

## 2019-04-25 PROCEDURE — 88312 SPECIAL STAINS GROUP 1: CPT | Performed by: PATHOLOGY

## 2019-04-25 PROCEDURE — 88305 TISSUE EXAM BY PATHOLOGIST: CPT | Performed by: PATHOLOGY

## 2019-04-25 PROCEDURE — 88342 IMHCHEM/IMCYTCHM 1ST ANTB: CPT | Performed by: PATHOLOGY

## 2019-04-25 PROCEDURE — 45380 COLONOSCOPY AND BIOPSY: CPT | Performed by: INTERNAL MEDICINE

## 2019-04-25 PROCEDURE — 82948 REAGENT STRIP/BLOOD GLUCOSE: CPT

## 2019-04-25 PROCEDURE — 43239 EGD BIOPSY SINGLE/MULTIPLE: CPT | Performed by: INTERNAL MEDICINE

## 2019-04-25 PROCEDURE — 88341 IMHCHEM/IMCYTCHM EA ADD ANTB: CPT | Performed by: PATHOLOGY

## 2019-04-25 PROCEDURE — 45385 COLONOSCOPY W/LESION REMOVAL: CPT | Performed by: INTERNAL MEDICINE

## 2019-04-25 RX ORDER — PROPOFOL 10 MG/ML
INJECTION, EMULSION INTRAVENOUS AS NEEDED
Status: DISCONTINUED | OUTPATIENT
Start: 2019-04-25 | End: 2019-04-25 | Stop reason: SURG

## 2019-04-25 RX ORDER — LIDOCAINE HYDROCHLORIDE 10 MG/ML
INJECTION, SOLUTION INFILTRATION; PERINEURAL AS NEEDED
Status: DISCONTINUED | OUTPATIENT
Start: 2019-04-25 | End: 2019-04-25 | Stop reason: SURG

## 2019-04-25 RX ORDER — SODIUM CHLORIDE, SODIUM LACTATE, POTASSIUM CHLORIDE, CALCIUM CHLORIDE 600; 310; 30; 20 MG/100ML; MG/100ML; MG/100ML; MG/100ML
125 INJECTION, SOLUTION INTRAVENOUS CONTINUOUS
Status: DISCONTINUED | OUTPATIENT
Start: 2019-04-25 | End: 2019-04-25 | Stop reason: HOSPADM

## 2019-04-25 RX ORDER — GLYCOPYRROLATE 0.2 MG/ML
INJECTION INTRAMUSCULAR; INTRAVENOUS AS NEEDED
Status: DISCONTINUED | OUTPATIENT
Start: 2019-04-25 | End: 2019-04-25 | Stop reason: SURG

## 2019-04-25 RX ADMIN — PROPOFOL 50 MG: 10 INJECTION, EMULSION INTRAVENOUS at 09:38

## 2019-04-25 RX ADMIN — GLYCOPYRROLATE 0.2 MG: 0.2 INJECTION, SOLUTION INTRAMUSCULAR; INTRAVENOUS at 09:26

## 2019-04-25 RX ADMIN — PROPOFOL 100 MG: 10 INJECTION, EMULSION INTRAVENOUS at 09:22

## 2019-04-25 RX ADMIN — PROPOFOL 50 MG: 10 INJECTION, EMULSION INTRAVENOUS at 09:35

## 2019-04-25 RX ADMIN — PROPOFOL 50 MG: 10 INJECTION, EMULSION INTRAVENOUS at 09:24

## 2019-04-25 RX ADMIN — PROPOFOL 50 MG: 10 INJECTION, EMULSION INTRAVENOUS at 09:32

## 2019-04-25 RX ADMIN — PROPOFOL 50 MG: 10 INJECTION, EMULSION INTRAVENOUS at 09:41

## 2019-04-25 RX ADMIN — SODIUM CHLORIDE, SODIUM LACTATE, POTASSIUM CHLORIDE, AND CALCIUM CHLORIDE 125 ML/HR: .6; .31; .03; .02 INJECTION, SOLUTION INTRAVENOUS at 09:17

## 2019-04-25 RX ADMIN — LIDOCAINE HYDROCHLORIDE 50 MG: 10 INJECTION, SOLUTION INFILTRATION; PERINEURAL at 09:27

## 2019-04-25 RX ADMIN — PROPOFOL 50 MG: 10 INJECTION, EMULSION INTRAVENOUS at 09:27

## 2019-04-30 ENCOUNTER — TELEPHONE (OUTPATIENT)
Dept: GASTROENTEROLOGY | Facility: CLINIC | Age: 49
End: 2019-04-30

## 2019-06-21 DIAGNOSIS — K21.9 CHRONIC GERD: ICD-10-CM

## 2019-06-21 DIAGNOSIS — R10.30 LOWER ABDOMINAL PAIN: ICD-10-CM

## 2019-06-21 DIAGNOSIS — R11.2 NAUSEA AND VOMITING, INTRACTABILITY OF VOMITING NOT SPECIFIED, UNSPECIFIED VOMITING TYPE: ICD-10-CM

## 2019-06-21 RX ORDER — FAMOTIDINE 40 MG/1
TABLET, FILM COATED ORAL
Qty: 30 TABLET | Refills: 1 | Status: SHIPPED | OUTPATIENT
Start: 2019-06-21 | End: 2019-09-19 | Stop reason: SDUPTHER

## 2019-09-11 NOTE — ASU PATIENT PROFILE, ADULT - MUTUALITY COMMENT, PROFILE
Chief Complaint   Patient presents with     RECHECK     IPF    Medications reviewed and vital signs taken.   Kei Bartholomew, IVY     agrees with plan

## 2019-09-19 DIAGNOSIS — R11.2 NAUSEA AND VOMITING, INTRACTABILITY OF VOMITING NOT SPECIFIED, UNSPECIFIED VOMITING TYPE: ICD-10-CM

## 2019-09-19 DIAGNOSIS — R10.30 LOWER ABDOMINAL PAIN: ICD-10-CM

## 2019-09-19 DIAGNOSIS — K21.9 CHRONIC GERD: ICD-10-CM

## 2019-09-19 RX ORDER — FAMOTIDINE 40 MG/1
TABLET, FILM COATED ORAL
Qty: 30 TABLET | Refills: 0 | Status: SHIPPED | OUTPATIENT
Start: 2019-09-19

## 2020-03-16 ENCOUNTER — RX ONLY (RX ONLY)
Age: 50
End: 2020-03-16

## 2020-03-16 ENCOUNTER — DOCTOR'S OFFICE (OUTPATIENT)
Dept: URBAN - METROPOLITAN AREA CLINIC 125 | Facility: CLINIC | Age: 50
Setting detail: OPHTHALMOLOGY
End: 2020-03-16
Payer: COMMERCIAL

## 2020-03-16 DIAGNOSIS — H35.033: ICD-10-CM

## 2020-03-16 DIAGNOSIS — H04.123: ICD-10-CM

## 2020-03-16 DIAGNOSIS — H43.391: ICD-10-CM

## 2020-03-16 DIAGNOSIS — E11.9: ICD-10-CM

## 2020-03-16 PROCEDURE — 99204 OFFICE O/P NEW MOD 45 MIN: CPT | Performed by: OPHTHALMOLOGY

## 2020-03-16 PROCEDURE — 92134 CPTRZ OPH DX IMG PST SGM RTA: CPT | Performed by: OPHTHALMOLOGY

## 2020-03-16 ASSESSMENT — CONFRONTATIONAL VISUAL FIELD TEST (CVF)
OD_FINDINGS: FULL
OS_FINDINGS: FULL

## 2020-03-16 ASSESSMENT — KERATOMETRY
OD_K2POWER_DIOPTERS: 48.75
OS_K1POWER_DIOPTERS: 46.75
OS_K2POWER_DIOPTERS: 48.75
OD_K1POWER_DIOPTERS: 46.75
OS_AXISANGLE_DEGREES: 089
OD_AXISANGLE_DEGREES: 095

## 2020-03-16 ASSESSMENT — REFRACTION_AUTOREFRACTION
OS_SPHERE: -0.75
OS_AXIS: 088
OD_AXIS: 086
OD_SPHERE: -1.00
OD_CYLINDER: -3.00
OS_CYLINDER: -3.00

## 2020-03-16 ASSESSMENT — SPHEQUIV_DERIVED
OD_SPHEQUIV: -2.5
OS_SPHEQUIV: -2.25

## 2020-03-16 ASSESSMENT — LID POSITION - DERMATOCHALASIS
OD_DERMATOCHALASIS: RUL 3+
OS_DERMATOCHALASIS: LUL 3+

## 2020-03-16 ASSESSMENT — VISUAL ACUITY
OD_BCVA: 20/150
OS_BCVA: 20/150-1

## 2020-03-16 ASSESSMENT — DRY EYES - PHYSICIAN NOTES
OD_GENERALCOMMENTS: 1-2+ PEE
OS_GENERALCOMMENTS: 1-2+ PEE

## 2020-03-16 ASSESSMENT — AXIALLENGTH_DERIVED
OD_AL: 23.0191
OS_AL: 22.9268

## 2020-06-18 ENCOUNTER — DOCTOR'S OFFICE (OUTPATIENT)
Dept: URBAN - METROPOLITAN AREA CLINIC 125 | Facility: CLINIC | Age: 50
Setting detail: OPHTHALMOLOGY
End: 2020-06-18
Payer: COMMERCIAL

## 2020-06-18 DIAGNOSIS — H35.033: ICD-10-CM

## 2020-06-18 DIAGNOSIS — H04.122: ICD-10-CM

## 2020-06-18 DIAGNOSIS — H04.121: ICD-10-CM

## 2020-06-18 PROCEDURE — 68761 CLOSE TEAR DUCT OPENING: CPT | Performed by: OPHTHALMOLOGY

## 2020-06-18 PROCEDURE — 92012 INTRM OPH EXAM EST PATIENT: CPT | Performed by: OPHTHALMOLOGY

## 2020-06-18 ASSESSMENT — KERATOMETRY
OS_AXISANGLE_DEGREES: 092
OD_K1POWER_DIOPTERS: 47.75
OD_AXISANGLE_DEGREES: 102
OS_K2POWER_DIOPTERS: 49.25
OS_K1POWER_DIOPTERS: 47.75
OD_K2POWER_DIOPTERS: 49.50

## 2020-06-18 ASSESSMENT — PUNCTA - ASSESSMENT
OS_PUNCTA: COL PLUG LLL LUL SMALL
OD_PUNCTA: COL PLUG RLL RUL SMALL

## 2020-06-18 ASSESSMENT — REFRACTION_AUTOREFRACTION
OS_SPHERE: -1.25
OD_AXIS: 099
OD_SPHERE: -0.75
OS_AXIS: 090
OS_CYLINDER: -2.00
OD_CYLINDER: -2.50

## 2020-06-18 ASSESSMENT — AXIALLENGTH_DERIVED
OD_AL: 22.5379
OS_AL: 22.6695

## 2020-06-18 ASSESSMENT — DRY EYES - PHYSICIAN NOTES
OS_GENERALCOMMENTS: 1+ PEE
OD_GENERALCOMMENTS: 1+ PEE

## 2020-06-18 ASSESSMENT — VISUAL ACUITY
OS_BCVA: 20/100
OD_BCVA: 20/70+2

## 2020-06-18 ASSESSMENT — CONFRONTATIONAL VISUAL FIELD TEST (CVF)
OD_FINDINGS: FULL
OS_FINDINGS: FULL

## 2020-06-18 ASSESSMENT — LID POSITION - DERMATOCHALASIS
OD_DERMATOCHALASIS: RUL 3+
OS_DERMATOCHALASIS: LUL 3+

## 2020-06-18 ASSESSMENT — SPHEQUIV_DERIVED
OD_SPHEQUIV: -2
OS_SPHEQUIV: -2.25

## 2020-06-23 ENCOUNTER — DOCTOR'S OFFICE (OUTPATIENT)
Dept: URBAN - METROPOLITAN AREA CLINIC 125 | Facility: CLINIC | Age: 50
Setting detail: OPHTHALMOLOGY
End: 2020-06-23
Payer: COMMERCIAL

## 2020-06-23 ENCOUNTER — OPTICAL OFFICE (OUTPATIENT)
Dept: URBAN - METROPOLITAN AREA CLINIC 134 | Facility: CLINIC | Age: 50
Setting detail: OPHTHALMOLOGY
End: 2020-06-23
Payer: COMMERCIAL

## 2020-06-23 DIAGNOSIS — H52.223: ICD-10-CM

## 2020-06-23 PROCEDURE — 92012 INTRM OPH EXAM EST PATIENT: CPT | Performed by: OPTOMETRIST

## 2020-06-23 PROCEDURE — V2020 VISION SVCS FRAMES PURCHASES: HCPCS | Performed by: OPTOMETRIST

## 2020-06-23 PROCEDURE — V2203 LENS SPHCYL BIFOCAL 4.00D/.1: HCPCS | Performed by: OPTOMETRIST

## 2020-06-23 PROCEDURE — 92015 DETERMINE REFRACTIVE STATE: CPT | Performed by: OPTOMETRIST

## 2020-06-23 ASSESSMENT — KERATOMETRY
OD_K2POWER_DIOPTERS: 49.00
OS_K1POWER_DIOPTERS: 46.50
OD_K1POWER_DIOPTERS: 47.25
OS_K2POWER_DIOPTERS: 48.75
OS_AXISANGLE_DEGREES: 088
OD_AXISANGLE_DEGREES: 094

## 2020-06-23 ASSESSMENT — SPHEQUIV_DERIVED
OS_SPHEQUIV: -1.5
OD_SPHEQUIV: -1.5
OD_SPHEQUIV: -2
OS_SPHEQUIV: -1.5

## 2020-06-23 ASSESSMENT — REFRACTION_AUTOREFRACTION
OS_AXIS: 085
OS_CYLINDER: -2.50
OD_SPHERE: -0.75
OD_CYLINDER: -2.50
OD_AXIS: 088
OS_SPHERE: -0.25

## 2020-06-23 ASSESSMENT — AXIALLENGTH_DERIVED
OD_AL: 22.7068
OS_AL: 22.6965
OD_AL: 22.5278
OS_AL: 22.6965

## 2020-06-23 ASSESSMENT — REFRACTION_MANIFEST
OS_VA1: 20/30
OD_ADD: +2.00
OD_CYLINDER: -1.50
OD_VA1: 20/30
OD_SPHERE: -0.75
OS_AXIS: 085
OS_ADD: +2.00
OS_SPHERE: -0.50
OS_CYLINDER: -2.00
OD_AXIS: 90

## 2020-06-23 ASSESSMENT — VISUAL ACUITY
OS_BCVA: 20/80
OD_BCVA: 20/70

## 2020-07-20 ENCOUNTER — DOCTOR'S OFFICE (OUTPATIENT)
Dept: URBAN - METROPOLITAN AREA CLINIC 125 | Facility: CLINIC | Age: 50
Setting detail: OPHTHALMOLOGY
End: 2020-07-20
Payer: COMMERCIAL

## 2020-07-20 DIAGNOSIS — H02.831: ICD-10-CM

## 2020-07-20 DIAGNOSIS — H04.121: ICD-10-CM

## 2020-07-20 DIAGNOSIS — H02.834: ICD-10-CM

## 2020-07-20 DIAGNOSIS — H04.122: ICD-10-CM

## 2020-07-20 DIAGNOSIS — H04.123: ICD-10-CM

## 2020-07-20 PROCEDURE — 68761 CLOSE TEAR DUCT OPENING: CPT | Performed by: OPHTHALMOLOGY

## 2020-07-20 PROCEDURE — 83861 MICROFLUID ANALY TEARS: CPT | Performed by: OPHTHALMOLOGY

## 2020-07-20 PROCEDURE — 92012 INTRM OPH EXAM EST PATIENT: CPT | Performed by: OPHTHALMOLOGY

## 2020-07-20 ASSESSMENT — SPHEQUIV_DERIVED
OD_SPHEQUIV: -2
OS_SPHEQUIV: -1.5
OS_SPHEQUIV: -1.5
OD_SPHEQUIV: -1.5

## 2020-07-20 ASSESSMENT — REFRACTION_AUTOREFRACTION
OS_SPHERE: -0.25
OD_AXIS: 088
OD_CYLINDER: -2.50
OD_SPHERE: -0.75
OS_AXIS: 085
OS_CYLINDER: -2.50

## 2020-07-20 ASSESSMENT — KERATOMETRY
OD_K2POWER_DIOPTERS: 49.00
OS_K2POWER_DIOPTERS: 48.75
OD_AXISANGLE_DEGREES: 094
OD_K1POWER_DIOPTERS: 47.25
OS_AXISANGLE_DEGREES: 088
OS_K1POWER_DIOPTERS: 46.50

## 2020-07-20 ASSESSMENT — REFRACTION_MANIFEST
OS_VA1: 20/30
OD_AXIS: 90
OD_ADD: +2.00
OD_CYLINDER: -1.50
OD_SPHERE: -0.75
OS_CYLINDER: -2.00
OS_AXIS: 085
OD_VA1: 20/30
OS_ADD: +2.00
OS_SPHERE: -0.50

## 2020-07-20 ASSESSMENT — AXIALLENGTH_DERIVED
OS_AL: 22.6965
OS_AL: 22.6965
OD_AL: 22.7068
OD_AL: 22.5278

## 2020-07-20 ASSESSMENT — DRY EYES - PHYSICIAN NOTES
OS_GENERALCOMMENTS: TR PEE
OD_GENERALCOMMENTS: TR PEE

## 2020-07-20 ASSESSMENT — PUNCTA - ASSESSMENT
OS_PUNCTA: COL PLUG LLL LUL SMALL
OD_PUNCTA: COL PLUG RLL RUL SMALL

## 2020-07-20 ASSESSMENT — VISUAL ACUITY
OD_BCVA: 20/30-1
OS_BCVA: 20/30

## 2020-07-20 ASSESSMENT — LID POSITION - DERMATOCHALASIS
OD_DERMATOCHALASIS: RUL 3+
OS_DERMATOCHALASIS: LUL 3+

## 2020-09-21 ENCOUNTER — DOCTOR'S OFFICE (OUTPATIENT)
Dept: URBAN - METROPOLITAN AREA CLINIC 125 | Facility: CLINIC | Age: 50
Setting detail: OPHTHALMOLOGY
End: 2020-09-21
Payer: COMMERCIAL

## 2020-09-21 DIAGNOSIS — H10.13: ICD-10-CM

## 2020-09-21 DIAGNOSIS — H04.123: ICD-10-CM

## 2020-09-21 PROCEDURE — 92012 INTRM OPH EXAM EST PATIENT: CPT | Performed by: OPHTHALMOLOGY

## 2020-09-21 ASSESSMENT — AXIALLENGTH_DERIVED
OS_AL: 22.6965
OD_AL: 22.7068
OS_AL: 22.6965
OD_AL: 22.5278

## 2020-09-21 ASSESSMENT — REFRACTION_MANIFEST
OS_CYLINDER: -2.00
OD_VA1: 20/30
OD_AXIS: 90
OS_AXIS: 085
OD_ADD: +2.00
OS_ADD: +2.00
OD_CYLINDER: -1.50
OD_SPHERE: -0.75
OS_VA1: 20/30
OS_SPHERE: -0.50

## 2020-09-21 ASSESSMENT — VISUAL ACUITY
OS_BCVA: 20/40
OD_BCVA: 20/150+1

## 2020-09-21 ASSESSMENT — LID POSITION - DERMATOCHALASIS
OD_DERMATOCHALASIS: RUL 3+
OS_DERMATOCHALASIS: LUL 3+

## 2020-09-21 ASSESSMENT — CONFRONTATIONAL VISUAL FIELD TEST (CVF)
OD_FINDINGS: FULL
OS_FINDINGS: FULL

## 2020-09-21 ASSESSMENT — SPHEQUIV_DERIVED
OS_SPHEQUIV: -1.5
OD_SPHEQUIV: -1.5
OS_SPHEQUIV: -1.5
OD_SPHEQUIV: -2

## 2020-09-21 ASSESSMENT — REFRACTION_AUTOREFRACTION
OS_CYLINDER: -2.50
OS_SPHERE: -0.25
OD_AXIS: 088
OS_AXIS: 085
OD_CYLINDER: -2.50
OD_SPHERE: -0.75

## 2020-09-21 ASSESSMENT — KERATOMETRY
OS_K2POWER_DIOPTERS: 48.75
OD_K1POWER_DIOPTERS: 47.25
OD_K2POWER_DIOPTERS: 49.00
OD_AXISANGLE_DEGREES: 094
OS_K1POWER_DIOPTERS: 46.50
OS_AXISANGLE_DEGREES: 088

## 2020-09-21 ASSESSMENT — PUNCTA - ASSESSMENT
OS_PUNCTA: SIL PLUG MED LLL
OD_PUNCTA: SIL PLUG MED RLL

## 2020-09-21 ASSESSMENT — DRY EYES - PHYSICIAN NOTES
OD_GENERALCOMMENTS: RAPID TBUT
OS_GENERALCOMMENTS: TR PEE INFERIORLY

## 2020-10-19 ENCOUNTER — DOCTOR'S OFFICE (OUTPATIENT)
Dept: URBAN - METROPOLITAN AREA CLINIC 125 | Facility: CLINIC | Age: 50
Setting detail: OPHTHALMOLOGY
End: 2020-10-19
Payer: COMMERCIAL

## 2020-10-19 ENCOUNTER — RX ONLY (RX ONLY)
Age: 50
End: 2020-10-19

## 2020-10-19 DIAGNOSIS — H10.13: ICD-10-CM

## 2020-10-19 DIAGNOSIS — H04.123: ICD-10-CM

## 2020-10-19 PROCEDURE — 92012 INTRM OPH EXAM EST PATIENT: CPT | Performed by: OPHTHALMOLOGY

## 2020-10-19 ASSESSMENT — SPHEQUIV_DERIVED
OS_SPHEQUIV: -1.5
OD_SPHEQUIV: -1.5
OD_SPHEQUIV: -1.5
OS_SPHEQUIV: -2

## 2020-10-19 ASSESSMENT — REFRACTION_MANIFEST
OS_SPHERE: -0.50
OS_VA1: 20/30
OD_SPHERE: -0.75
OD_AXIS: 90
OS_ADD: +2.00
OD_CYLINDER: -1.50
OD_ADD: +2.00
OD_VA1: 20/30
OS_AXIS: 085
OS_CYLINDER: -2.00

## 2020-10-19 ASSESSMENT — KERATOMETRY
OS_K1POWER_DIOPTERS: 47.50
OD_K1POWER_DIOPTERS: 47.50
OD_K2POWER_DIOPTERS: 49.25
OS_K2POWER_DIOPTERS: 49.00
OS_AXISANGLE_DEGREES: 089
OD_AXISANGLE_DEGREES: 099

## 2020-10-19 ASSESSMENT — LID POSITION - DERMATOCHALASIS
OD_DERMATOCHALASIS: RUL 3+
OS_DERMATOCHALASIS: LUL 3+

## 2020-10-19 ASSESSMENT — AXIALLENGTH_DERIVED
OS_AL: 22.6644
OD_AL: 22.4444
OD_AL: 22.4444
OS_AL: 22.486

## 2020-10-19 ASSESSMENT — CONFRONTATIONAL VISUAL FIELD TEST (CVF)
OD_FINDINGS: FULL
OS_FINDINGS: FULL

## 2020-10-19 ASSESSMENT — PUNCTA - ASSESSMENT
OD_PUNCTA: SIL PLUG MED RLL
OS_PUNCTA: SIL PLUG MED LLL

## 2020-10-19 ASSESSMENT — REFRACTION_AUTOREFRACTION
OD_SPHERE: -0.25
OS_AXIS: 084
OS_SPHERE: -1.00
OD_CYLINDER: -2.50
OS_CYLINDER: -2.00
OD_AXIS: 101

## 2020-10-19 ASSESSMENT — VISUAL ACUITY
OS_BCVA: 20/50-2
OD_BCVA: 20/80-1

## 2021-04-15 ENCOUNTER — DOCTOR'S OFFICE (OUTPATIENT)
Dept: URBAN - METROPOLITAN AREA CLINIC 125 | Facility: CLINIC | Age: 51
Setting detail: OPHTHALMOLOGY
End: 2021-04-15
Payer: MEDICARE

## 2021-04-15 DIAGNOSIS — H04.123: ICD-10-CM

## 2021-04-15 DIAGNOSIS — H35.033: ICD-10-CM

## 2021-04-15 DIAGNOSIS — E11.9: ICD-10-CM

## 2021-04-15 DIAGNOSIS — H25.13: ICD-10-CM

## 2021-04-15 DIAGNOSIS — H43.391: ICD-10-CM

## 2021-04-15 PROCEDURE — 92014 COMPRE OPH EXAM EST PT 1/>: CPT | Performed by: OPHTHALMOLOGY

## 2021-04-15 PROCEDURE — 92134 CPTRZ OPH DX IMG PST SGM RTA: CPT | Performed by: OPHTHALMOLOGY

## 2021-04-15 ASSESSMENT — PUNCTA - ASSESSMENT
OS_PUNCTA: SIL PLUG MED LLL
OD_PUNCTA: SIL PLUG MED RLL

## 2021-04-15 ASSESSMENT — REFRACTION_MANIFEST
OD_ADD: +2.00
OD_CYLINDER: -1.50
OS_VA1: 20/30
OS_CYLINDER: -2.00
OS_SPHERE: -0.50
OD_AXIS: 90
OS_AXIS: 085
OD_VA1: 20/30
OD_SPHERE: -0.75
OS_ADD: +2.00

## 2021-04-15 ASSESSMENT — REFRACTION_AUTOREFRACTION
OS_CYLINDER: -1.75
OS_SPHERE: -1.50
OD_CYLINDER: -2.75
OD_AXIS: 099
OD_SPHERE: -0.50
OS_AXIS: 091

## 2021-04-15 ASSESSMENT — KERATOMETRY
OS_K2POWER_DIOPTERS: 48.75
OD_AXISANGLE_DEGREES: 103
OD_K1POWER_DIOPTERS: 47.00
OS_AXISANGLE_DEGREES: 089
OS_K1POWER_DIOPTERS: 46.50
OD_K2POWER_DIOPTERS: 49.75

## 2021-04-15 ASSESSMENT — VISUAL ACUITY
OD_BCVA: 20/150
OS_BCVA: 20/70-1

## 2021-04-15 ASSESSMENT — CONFRONTATIONAL VISUAL FIELD TEST (CVF)
OS_FINDINGS: FULL
OD_FINDINGS: FULL

## 2021-04-15 ASSESSMENT — AXIALLENGTH_DERIVED
OD_AL: 22.4444
OD_AL: 22.5774
OS_AL: 22.6965
OS_AL: 23.0165

## 2021-04-15 ASSESSMENT — SPHEQUIV_DERIVED
OS_SPHEQUIV: -1.5
OD_SPHEQUIV: -1.5
OS_SPHEQUIV: -2.375
OD_SPHEQUIV: -1.875

## 2021-04-15 ASSESSMENT — LID POSITION - DERMATOCHALASIS
OS_DERMATOCHALASIS: LUL 3+
OD_DERMATOCHALASIS: RUL 3+

## 2021-04-15 ASSESSMENT — DRY EYES - PHYSICIAN NOTES
OD_GENERALCOMMENTS: TR PEE
OS_GENERALCOMMENTS: TR PEE

## 2021-06-04 ENCOUNTER — DOCTOR'S OFFICE (OUTPATIENT)
Dept: URBAN - METROPOLITAN AREA CLINIC 125 | Facility: CLINIC | Age: 51
Setting detail: OPHTHALMOLOGY
End: 2021-06-04
Payer: COMMERCIAL

## 2021-06-04 ENCOUNTER — OPTICAL OFFICE (OUTPATIENT)
Dept: URBAN - METROPOLITAN AREA CLINIC 134 | Facility: CLINIC | Age: 51
Setting detail: OPHTHALMOLOGY
End: 2021-06-04
Payer: COMMERCIAL

## 2021-06-04 DIAGNOSIS — H52.223: ICD-10-CM

## 2021-06-04 DIAGNOSIS — H52.4: ICD-10-CM

## 2021-06-04 PROCEDURE — 92015 DETERMINE REFRACTIVE STATE: CPT | Performed by: OPTOMETRIST

## 2021-06-04 PROCEDURE — 92012 INTRM OPH EXAM EST PATIENT: CPT | Performed by: OPTOMETRIST

## 2021-06-04 PROCEDURE — V2750 ANTI-REFLECTIVE COATING: HCPCS | Performed by: OPTOMETRIST

## 2021-06-04 PROCEDURE — V2020 VISION SVCS FRAMES PURCHASES: HCPCS | Performed by: OPTOMETRIST

## 2021-06-04 PROCEDURE — V2103 SPHEROCYLINDR 4.00D/12-2.00D: HCPCS | Performed by: OPTOMETRIST

## 2021-06-04 PROCEDURE — V2744 TINT PHOTOCHROMATIC LENS/ES: HCPCS | Performed by: OPTOMETRIST

## 2021-06-04 ASSESSMENT — VISUAL ACUITY
OD_BCVA: 20/150
OS_BCVA: 20/70-2

## 2021-06-04 ASSESSMENT — REFRACTION_AUTOREFRACTION
OD_SPHERE: +0.25
OD_AXIS: 097
OS_SPHERE: -0.75
OS_CYLINDER: -2.25
OS_AXIS: 101
OD_CYLINDER: -3.00

## 2021-06-04 ASSESSMENT — REFRACTION_MANIFEST
OS_CYLINDER: -2.00
OD_CYLINDER: -2.00
OS_ADD: +2.00
OD_SPHERE: -0.25
OS_VA2: 20/30
OD_VA2: 20/30
OS_VA1: 20/30
OD_VA1: 20/30
OS_AXIS: 085
OD_AXIS: 094
OD_ADD: +2.00
OS_SPHERE: -0.50

## 2021-06-04 ASSESSMENT — KERATOMETRY
OD_K2POWER_DIOPTERS: 48.50
OS_K2POWER_DIOPTERS: 48.75
OD_AXISANGLE_DEGREES: 099
OD_K1POWER_DIOPTERS: 46.50
OS_K1POWER_DIOPTERS: 46.75
OS_AXISANGLE_DEGREES: 097

## 2021-06-04 ASSESSMENT — SPHEQUIV_DERIVED
OS_SPHEQUIV: -1.5
OD_SPHEQUIV: -1.25
OS_SPHEQUIV: -1.875
OD_SPHEQUIV: -1.25

## 2021-06-04 ASSESSMENT — AXIALLENGTH_DERIVED
OS_AL: 22.6541
OS_AL: 22.7896
OD_AL: 22.6489
OD_AL: 22.6489

## 2022-01-06 ENCOUNTER — DOCTOR'S OFFICE (OUTPATIENT)
Dept: URBAN - METROPOLITAN AREA CLINIC 125 | Facility: CLINIC | Age: 52
Setting detail: OPHTHALMOLOGY
End: 2022-01-06
Payer: MEDICARE

## 2022-01-06 DIAGNOSIS — H00.12: ICD-10-CM

## 2022-01-06 DIAGNOSIS — H10.13: ICD-10-CM

## 2022-01-06 PROCEDURE — 92014 COMPRE OPH EXAM EST PT 1/>: CPT | Performed by: OPHTHALMOLOGY

## 2022-01-06 ASSESSMENT — KERATOMETRY
OD_K2POWER_DIOPTERS: 48.75
OD_AXISANGLE_DEGREES: 117
OD_K1POWER_DIOPTERS: 48.00
OS_AXISANGLE_DEGREES: 096
OS_K1POWER_DIOPTERS: 46.50
OS_K2POWER_DIOPTERS: 48.50

## 2022-01-06 ASSESSMENT — REFRACTION_MANIFEST
OS_CYLINDER: -2.00
OS_AXIS: 085
OD_AXIS: 094
OS_ADD: +2.00
OD_VA1: 20/30
OD_VA2: 20/30
OS_VA1: 20/30
OD_SPHERE: -0.25
OS_VA2: 20/30
OD_CYLINDER: -2.00
OS_SPHERE: -0.50
OD_ADD: +2.00

## 2022-01-06 ASSESSMENT — LID POSITION - DERMATOCHALASIS
OS_DERMATOCHALASIS: LUL 3+
OD_DERMATOCHALASIS: RUL 3+

## 2022-01-06 ASSESSMENT — PUNCTA - ASSESSMENT
OS_PUNCTA: SIL PLUG MED LLL
OD_PUNCTA: SIL PLUG MED RLL

## 2022-01-06 ASSESSMENT — REFRACTION_AUTOREFRACTION
OS_SPHERE: -0.50
OD_SPHERE: -0.25
OS_AXIS: 095
OD_CYLINDER: -2.50
OS_CYLINDER: -2.25
OD_AXIS: 099

## 2022-01-06 ASSESSMENT — AXIALLENGTH_DERIVED
OS_AL: 22.7391
OD_AL: 22.3565
OS_AL: 22.7844
OD_AL: 22.4444

## 2022-01-06 ASSESSMENT — SPHEQUIV_DERIVED
OD_SPHEQUIV: -1.25
OD_SPHEQUIV: -1.5
OS_SPHEQUIV: -1.625
OS_SPHEQUIV: -1.5

## 2022-01-06 ASSESSMENT — DRY EYES - PHYSICIAN NOTES
OD_GENERALCOMMENTS: TR PEE
OS_GENERALCOMMENTS: TR PEE

## 2022-01-06 ASSESSMENT — VISUAL ACUITY
OS_BCVA: 20/70-2
OD_BCVA: 20/70-1

## 2022-06-13 ENCOUNTER — DOCTOR'S OFFICE (OUTPATIENT)
Dept: URBAN - METROPOLITAN AREA CLINIC 125 | Facility: CLINIC | Age: 52
Setting detail: OPHTHALMOLOGY
End: 2022-06-13
Payer: MEDICARE

## 2022-06-13 ENCOUNTER — RX ONLY (RX ONLY)
Age: 52
End: 2022-06-13

## 2022-06-13 DIAGNOSIS — H10.13: ICD-10-CM

## 2022-06-13 DIAGNOSIS — H00.12: ICD-10-CM

## 2022-06-13 PROCEDURE — 99213 OFFICE O/P EST LOW 20 MIN: CPT | Performed by: OPHTHALMOLOGY

## 2022-06-13 ASSESSMENT — REFRACTION_AUTOREFRACTION
OD_SPHERE: -1.00
OS_CYLINDER: -2.00
OD_AXIS: 094
OS_SPHERE: -1.50
OS_AXIS: 089
OD_CYLINDER: -2.75

## 2022-06-13 ASSESSMENT — SPHEQUIV_DERIVED
OD_SPHEQUIV: -1.25
OS_SPHEQUIV: -2.5
OS_SPHEQUIV: -1.5
OD_SPHEQUIV: -2.375

## 2022-06-13 ASSESSMENT — KERATOMETRY
OD_K2POWER_DIOPTERS: 48.75
OS_K2POWER_DIOPTERS: 48.75
OD_K1POWER_DIOPTERS: 46.25
OS_K1POWER_DIOPTERS: 47.25
OS_AXISANGLE_DEGREES: 001
OD_AXISANGLE_DEGREES: 009

## 2022-06-13 ASSESSMENT — REFRACTION_MANIFEST
OS_CYLINDER: -2.00
OD_AXIS: 094
OD_VA2: 20/30
OD_VA1: 20/30
OD_ADD: +2.00
OS_AXIS: 085
OS_ADD: +2.00
OS_VA1: 20/30
OD_CYLINDER: -2.00
OD_SPHERE: -0.25
OS_SPHERE: -0.50
OS_VA2: 20/30

## 2022-06-13 ASSESSMENT — LID POSITION - DERMATOCHALASIS
OD_DERMATOCHALASIS: RUL 3+
OS_DERMATOCHALASIS: LUL 3+

## 2022-06-13 ASSESSMENT — VISUAL ACUITY
OS_BCVA: 20/80-1
OD_BCVA: 20/100-1

## 2022-06-13 ASSESSMENT — CONFRONTATIONAL VISUAL FIELD TEST (CVF)
OS_FINDINGS: FULL
OD_FINDINGS: FULL

## 2022-06-13 ASSESSMENT — AXIALLENGTH_DERIVED
OD_AL: 22.6489
OD_AL: 23.0602
OS_AL: 22.5697
OS_AL: 22.932

## 2022-06-13 ASSESSMENT — DRY EYES - PHYSICIAN NOTES
OD_GENERALCOMMENTS: TR PEE
OS_GENERALCOMMENTS: TR PEE

## 2022-06-13 ASSESSMENT — PUNCTA - ASSESSMENT
OS_PUNCTA: SIL PLUG MED LLL
OD_PUNCTA: SIL PLUG MED RLL

## 2022-09-08 ENCOUNTER — DOCTOR'S OFFICE (OUTPATIENT)
Dept: URBAN - METROPOLITAN AREA CLINIC 125 | Facility: CLINIC | Age: 52
Setting detail: OPHTHALMOLOGY
End: 2022-09-08
Payer: MEDICARE

## 2022-09-08 ENCOUNTER — RX ONLY (RX ONLY)
Age: 52
End: 2022-09-08

## 2022-09-08 DIAGNOSIS — H35.033: ICD-10-CM

## 2022-09-08 DIAGNOSIS — E11.9: ICD-10-CM

## 2022-09-08 DIAGNOSIS — H04.123: ICD-10-CM

## 2022-09-08 DIAGNOSIS — H02.831: ICD-10-CM

## 2022-09-08 DIAGNOSIS — H02.834: ICD-10-CM

## 2022-09-08 DIAGNOSIS — H25.13: ICD-10-CM

## 2022-09-08 PROBLEM — H35.032: Status: ACTIVE | Noted: 2020-03-16

## 2022-09-08 PROBLEM — H35.031: Status: ACTIVE | Noted: 2020-03-16

## 2022-09-08 PROBLEM — H04.121 DRY EYE; RIGHT EYE, LEFT EYE: Status: ACTIVE | Noted: 2020-03-16

## 2022-09-08 PROBLEM — H52.223 ASTIGMATISM, REGULAR; BOTH EYES: Status: ACTIVE | Noted: 2020-06-23

## 2022-09-08 PROBLEM — H00.12 CHALAZION; RIGHT LOWER LID: Status: RESOLVED | Noted: 2022-01-06 | Resolved: 2022-09-08

## 2022-09-08 PROBLEM — H10.12 ALLERGIC CONJUNCTIVITIS; RIGHT EYE, LEFT EYE: Status: ACTIVE | Noted: 2020-09-21

## 2022-09-08 PROBLEM — H10.11 ALLERGIC CONJUNCTIVITIS; RIGHT EYE, LEFT EYE: Status: ACTIVE | Noted: 2020-09-21

## 2022-09-08 PROBLEM — H04.122 DRY EYE; RIGHT EYE, LEFT EYE: Status: ACTIVE | Noted: 2020-03-16

## 2022-09-08 PROCEDURE — 92134 CPTRZ OPH DX IMG PST SGM RTA: CPT | Performed by: OPHTHALMOLOGY

## 2022-09-08 PROCEDURE — 99214 OFFICE O/P EST MOD 30 MIN: CPT | Performed by: OPHTHALMOLOGY

## 2022-09-08 ASSESSMENT — REFRACTION_MANIFEST
OS_VA2: 20/30
OS_SPHERE: -0.50
OS_ADD: +2.00
OD_VA1: 20/30
OD_SPHERE: -0.25
OD_CYLINDER: -2.00
OS_CYLINDER: -2.00
OD_ADD: +2.00
OS_VA1: 20/30
OD_AXIS: 094
OS_AXIS: 085
OD_VA2: 20/30

## 2022-09-08 ASSESSMENT — SPHEQUIV_DERIVED
OD_SPHEQUIV: -2
OS_SPHEQUIV: -1.875
OD_SPHEQUIV: -1.25
OS_SPHEQUIV: -1.5

## 2022-09-08 ASSESSMENT — KERATOMETRY
OD_K2POWER_DIOPTERS: 49.50
OD_K1POWER_DIOPTERS: 47.50
OD_AXISANGLE_DEGREES: 010
OS_K2POWER_DIOPTERS: 49.25
OS_K1POWER_DIOPTERS: 47.25
OS_AXISANGLE_DEGREES: 179

## 2022-09-08 ASSESSMENT — AXIALLENGTH_DERIVED
OS_AL: 22.486
OD_AL: 22.3154
OS_AL: 22.6195
OD_AL: 22.5799

## 2022-09-08 ASSESSMENT — PUNCTA - ASSESSMENT
OD_PUNCTA: SIL PLUG MED RLL
OS_PUNCTA: SIL PLUG MED LLL

## 2022-09-08 ASSESSMENT — REFRACTION_AUTOREFRACTION
OD_SPHERE: -0.75
OS_CYLINDER: -1.75
OS_SPHERE: -1.00
OD_CYLINDER: -2.50
OS_AXIS: 082
OD_AXIS: 100

## 2022-09-08 ASSESSMENT — LID POSITION - DERMATOCHALASIS
OD_DERMATOCHALASIS: RUL 1+
OS_DERMATOCHALASIS: LUL 1+

## 2022-09-08 ASSESSMENT — SUPERFICIAL PUNCTATE KERATITIS (SPK)
OS_SPK: 1+
OD_SPK: 1+

## 2022-09-08 ASSESSMENT — VISUAL ACUITY
OD_BCVA: 20/200+2
OS_BCVA: 20/100+1

## 2022-09-08 ASSESSMENT — CONFRONTATIONAL VISUAL FIELD TEST (CVF)
OS_FINDINGS: FULL
OD_FINDINGS: FULL

## 2023-07-27 ENCOUNTER — DOCTOR'S OFFICE (OUTPATIENT)
Dept: URBAN - METROPOLITAN AREA CLINIC 125 | Facility: CLINIC | Age: 53
Setting detail: OPHTHALMOLOGY
End: 2023-07-27
Payer: COMMERCIAL

## 2023-07-27 ENCOUNTER — OPTICAL OFFICE (OUTPATIENT)
Dept: URBAN - METROPOLITAN AREA CLINIC 134 | Facility: CLINIC | Age: 53
Setting detail: OPHTHALMOLOGY
End: 2023-07-27
Payer: COMMERCIAL

## 2023-07-27 DIAGNOSIS — H52.13: ICD-10-CM

## 2023-07-27 DIAGNOSIS — H52.4: ICD-10-CM

## 2023-07-27 DIAGNOSIS — H52.223: ICD-10-CM

## 2023-07-27 PROCEDURE — V2744 TINT PHOTOCHROMATIC LENS/ES: HCPCS | Performed by: OPHTHALMOLOGY

## 2023-07-27 PROCEDURE — V2020 VISION SVCS FRAMES PURCHASES: HCPCS | Performed by: OPHTHALMOLOGY

## 2023-07-27 PROCEDURE — V2781 PROGRESSIVE LENS PER LENS: HCPCS | Performed by: OPHTHALMOLOGY

## 2023-07-27 PROCEDURE — V2750 ANTI-REFLECTIVE COATING: HCPCS | Performed by: OPHTHALMOLOGY

## 2023-07-27 PROCEDURE — 92012 INTRM OPH EXAM EST PATIENT: CPT | Performed by: OPHTHALMOLOGY

## 2023-07-27 PROCEDURE — 92015 DETERMINE REFRACTIVE STATE: CPT | Performed by: OPHTHALMOLOGY

## 2023-07-27 ASSESSMENT — AXIALLENGTH_DERIVED
OD_AL: 22.2719
OS_AL: 22.4418
OD_AL: 22.3154
OS_AL: 22.4418

## 2023-07-27 ASSESSMENT — LID POSITION - DERMATOCHALASIS
OS_DERMATOCHALASIS: LUL 1+
OD_DERMATOCHALASIS: RUL 1+

## 2023-07-27 ASSESSMENT — REFRACTION_MANIFEST
OD_AXIS: 098
OD_VA1: 20/40+2
OD_SPHERE: -0.25
OS_VA1: 20/30+2
OD_VA2: 20/30
OD_ADD: +1.75
OS_VA2: 20/30
OS_ADD: +1.75
OS_CYLINDER: -1.25
OS_SPHERE: -0.75
OS_AXIS: 093
OD_CYLINDER: -2.00
OU_VA: 20/25-2

## 2023-07-27 ASSESSMENT — CONFRONTATIONAL VISUAL FIELD TEST (CVF)
OD_FINDINGS: FULL
OS_FINDINGS: FULL

## 2023-07-27 ASSESSMENT — KERATOMETRY
OD_K2POWER_DIOPTERS: 49.50
OS_K1POWER_DIOPTERS: 47.25
OS_AXISANGLE_DEGREES: 179
OS_K2POWER_DIOPTERS: 49.25
OD_K1POWER_DIOPTERS: 47.50
OD_AXISANGLE_DEGREES: 010

## 2023-07-27 ASSESSMENT — REFRACTION_AUTOREFRACTION
OD_AXIS: 099
OD_SPHERE: +0.25
OD_CYLINDER: -2.75
OS_AXIS: 085
OS_SPHERE: -0.50
OS_CYLINDER: -1.75

## 2023-07-27 ASSESSMENT — SPHEQUIV_DERIVED
OS_SPHEQUIV: -1.375
OD_SPHEQUIV: -1.125
OD_SPHEQUIV: -1.25
OS_SPHEQUIV: -1.375

## 2023-07-27 ASSESSMENT — PUNCTA - ASSESSMENT
OD_PUNCTA: SIL PLUG MED RLL
OS_PUNCTA: SIL PLUG MED LLL

## 2023-07-27 ASSESSMENT — SUPERFICIAL PUNCTATE KERATITIS (SPK)
OD_SPK: 1+
OS_SPK: 1+

## 2023-07-27 ASSESSMENT — VISUAL ACUITY
OS_BCVA: 20/60-2
OD_BCVA: 20/70+2

## 2024-02-08 ENCOUNTER — DOCTOR'S OFFICE (OUTPATIENT)
Dept: URBAN - METROPOLITAN AREA CLINIC 125 | Facility: CLINIC | Age: 54
Setting detail: OPHTHALMOLOGY
End: 2024-02-08
Payer: MEDICARE

## 2024-02-08 DIAGNOSIS — H35.373: ICD-10-CM

## 2024-02-08 DIAGNOSIS — H02.831: ICD-10-CM

## 2024-02-08 DIAGNOSIS — H04.123: ICD-10-CM

## 2024-02-08 DIAGNOSIS — E11.9: ICD-10-CM

## 2024-02-08 DIAGNOSIS — H35.033: ICD-10-CM

## 2024-02-08 DIAGNOSIS — H25.13: ICD-10-CM

## 2024-02-08 DIAGNOSIS — H02.834: ICD-10-CM

## 2024-02-08 PROCEDURE — 92134 CPTRZ OPH DX IMG PST SGM RTA: CPT | Performed by: OPHTHALMOLOGY

## 2024-02-08 PROCEDURE — 92014 COMPRE OPH EXAM EST PT 1/>: CPT | Performed by: OPHTHALMOLOGY

## 2024-02-08 ASSESSMENT — CONFRONTATIONAL VISUAL FIELD TEST (CVF)
OS_FINDINGS: FULL
OD_FINDINGS: FULL

## 2024-02-08 ASSESSMENT — LID POSITION - DERMATOCHALASIS
OD_DERMATOCHALASIS: RUL 1+
OS_DERMATOCHALASIS: LUL 1+

## 2024-02-08 ASSESSMENT — LID POSITION - PTOSIS
OS_PTOSIS: ABSENT
OD_PTOSIS: ABSENT

## (undated) DEVICE — WORKING LENGTH 235CM, WORKING CHANNEL 2.8MM: Brand: RESOLUTION 360 CLIP